# Patient Record
Sex: FEMALE | Race: BLACK OR AFRICAN AMERICAN
[De-identification: names, ages, dates, MRNs, and addresses within clinical notes are randomized per-mention and may not be internally consistent; named-entity substitution may affect disease eponyms.]

---

## 2018-03-25 ENCOUNTER — HOSPITAL ENCOUNTER (EMERGENCY)
Dept: HOSPITAL 72 - EMR | Age: 48
Discharge: HOME | End: 2018-03-25
Payer: SELF-PAY

## 2018-03-25 VITALS — SYSTOLIC BLOOD PRESSURE: 136 MMHG | DIASTOLIC BLOOD PRESSURE: 77 MMHG

## 2018-03-25 VITALS — WEIGHT: 170 LBS | HEIGHT: 66 IN | BODY MASS INDEX: 27.32 KG/M2

## 2018-03-25 DIAGNOSIS — Z90.710: ICD-10-CM

## 2018-03-25 DIAGNOSIS — M54.5: Primary | ICD-10-CM

## 2018-03-25 PROCEDURE — 96372 THER/PROPH/DIAG INJ SC/IM: CPT

## 2018-03-25 PROCEDURE — 99284 EMERGENCY DEPT VISIT MOD MDM: CPT

## 2018-03-25 PROCEDURE — 74018 RADEX ABDOMEN 1 VIEW: CPT

## 2018-03-25 NOTE — EMERGENCY ROOM REPORT
History of Present Illness


General


Chief Complaint:  Back Pain-No Injury


Source:  Patient


 (Yoselyn Babb)





Present Illness


HPI


46 YO Female presents to ED c/o left sided 10/10 in severity low back pain x 3 

days. had colonic performed 3 days ago, has hx of sciatica. 


no trauma or falls.She denies abdominal tenderness or abdominal pain.  Denies 

nausea, vomiting, diarrhea, constipation.  Patient denies history of kidney 

stones. Denies dysuria, hematuria, radiation or migration of pain.  Patient 

reports she also has posterior tenderness to palpation of the left side of the 

back Denies numbness tingling or loss of sensation or gross motor movements of 

the extremities, incontinence of bowel or bladder. Denies CP, Palpitations, LOC

, AMS, dizziness, Changes in Vision, Sensation, paresthesias, or a sudden 

severe headache. 


 (Yoselyn Babb)


Allergies:  


Uncoded Allergies:  


     TB SKIN TEST (Allergy, Unknown, 3/25/18)





Patient History


Past Medical History:  see triage record


Past Surgical History:  none


Pertinent Family History:  none


Last Menstrual Period:  hysterectomy 2015


Pregnant Now:  No


:  2


Para:  2


Reviewed Nursing Documentation:  PMH: Agreed; PSxH: Agreed (Yoselyn Babb)





Nursing Documentation-PMH


Past Medical History:  No History, Except For


 (Yoselyn Babb)





Review of Systems


All Other Systems:  negative except mentioned in HPI


 (Yoselyn Babb)





Physical Exam





Vital Signs








  Date Time  Temp Pulse Resp B/P (MAP) Pulse Ox O2 Delivery O2 Flow Rate FiO2


 


3/25/18 16:58 98.2 85 16 136/77 98 Room Air  





 98.2       








Sp02 EP Interpretation:  reviewed, normal


General Appearance:  no apparent distress, alert, GCS 15, non-toxic


Head:  normocephalic, atraumatic


ENT:  hearing grossly normal, normal voice


Neck:  full range of motion


Respiratory:  lungs clear, normal breath sounds, speaking full sentences


Cardiovascular #1:  regular rate, rhythm


Gastrointestinal:  normal bowel sounds, non tender, soft, no guarding


Genitourinary:  normal inspection, no CVA tenderness


Musculoskeletal:  back normal, gait/station normal, normal range of motion, 

tender - Moderate tenderness to palpation to paraspinal muscles of the lower 

back on the left side, no spinous process tenderness, no midline tenderness.


Neurologic:  alert, oriented x3, responsive, motor strength/tone normal, 

sensory intact, normal gait, speech normal, grossly normal


Psychiatric:  judgement/insight normal


Skin:  normal color, no rash, warm/dry, well hydrated


 (Yoselyn Babb)





Medical Decision Making


PA Attestation


Dr. Oliver is my supervising Physician whom patient management has been 

discussed with. 


 (Yoselyn Babb)


Diagnostic Impression:  


 Primary Impression:  


 Back pain


 Qualified Codes:  M54.5 - Low back pain


ER Course


46 YO Female presents to ED c/o left sided 10/10 in severity low back pain x 3 

days. had colonic performed 3 days ago, has hx of sciatica. 


no trauma or falls.She denies abdominal tenderness or abdominal pain.  Denies 

nausea, vomiting, diarrhea, constipation.  Patient denies history of kidney 

stones. Denies dysuria, hematuria, radiation or migration of pain.  Patient 

reports she also has posterior tenderness to palpation of the left side of the 

back Denies numbness tingling or loss of sensation or gross motor movements of 

the extremities, incontinence of bowel or bladder. Denies CP, Palpitations, LOC

, AMS, dizziness, Changes in Vision, Sensation, paresthesias, or a sudden 

severe headache. Denies hx of cancer or recent spinal procedures. pt. reports 

her symptoms may have started when she dropped soap on shower floor and reached 

down to pick it up.  





Ddx considered: bowel perforation, musculo-skeletal injury, epidural abscess, 

fracture, sprain/strain, meningitis, spinal chord injury. 


-History of physical exam are most consistent with muscular injury due to 

reproducible pain/tenderness upon palpation.  Absence of fever or urinary 

symptoms.





Vital signs reviewed and are WNL during ED visit.


Pt. is afebrile with no signs of infection


No new symptoms, and denies recent trauma.


No saddle anesthesia noted, Pt. denies incontinence 


Neurovascular is intact


no appreciable CVA tenderness. 


* Moderate tenderness to palpation to paraspinal muscles of the lower back on 

the left side, no spinous process tenderness, no midline tenderness. 








ORDERS: 





-Abdominal KUB x-ray: Unremarkable no evidence of free air.  Nonspecific bowel 

gas pattern noted.





INTERVENTIONS: 


- Toradol IM


- Tramadol PO





d/w pt. conservative treatment, and to follow up with a primary care provider. 

pt given a list of primary care clinics for follow up. d/w pt. to return to the 

ED with worsening or new symptoms.





DISCHARGE: At this time pt. is stable for d/c to home. Will provide printed 

patient care instructions, and any necessary prescriptions. Care plan and 

follow up instructions have been discussed with the patient prior to discharge.


 (Yoselyn Babb)





Other X-Ray Diagnostic Results


Other X-Ray Diagnostic Results :  


   X-Ray ordered:  KUB


   # of Views/Limited Vs Complete:  1 View


   Indication:  Pain


   EP Interpretation:  Yes


   PA Xray:  Interpretation reviewed, by supervising MD, and agrees with 

findings.


   Interpretation:  nonspecific bowel gas, no sbo - no free air noted.


   Impression:  No acute disease


   Electronically Signed by:  Yoselyn Babb PA-C


 (Yoselyn Babb)


Other X-Ray Diagnostic Results :  


   Electronically Signed by:  Sha documentation reviewed by me and is 

accurate, Jose Oliver MD.


 (Jose Oliver M.D.)





Last Vital Signs








  Date Time  Temp Pulse Resp B/P (MAP) Pulse Ox O2 Delivery O2 Flow Rate FiO2


 


3/25/18 18:37 98.2       


 


3/25/18 17:33  85 16 136/77 98 Room Air  








 (Yoselyn Babb.A.)





Last Vital Signs








  Date Time  Temp Pulse Resp B/P (MAP) Pulse Ox O2 Delivery O2 Flow Rate FiO2


 


3/25/18 19:06 98.2 85 16 136/77 98 Room Air  





 98.2       








 (Jose Oliver M.D.)


Disposition:  HOME, SELF-CARE


Condition:  Stable


Scripts


Ibuprofen* (MOTRIN*) 600 Mg Tablet


600 MG ORAL THREE TIMES A DAY, #20 TAB 0 Refills


   Prov: Yoselyn Babb P.A.         3/25/18 


Lidocaine (Lidoderm) 1 Each Adh..patch


1 PATCH TOPIC DAILY, #30 PATCH 0 Refills


   Patch(es) may remain in place for up to 12 hours in any 24-hour


   period.


   Prov: Yoselyn Babb P.A.         3/25/18 


Methocarbamol* (ROBAXIN*) 500 Mg Tablet


1000 MG PO TID, #42 TAB 0 Refills


   Prov: Yoselyn Babb P.A.         3/25/18


Referrals:  


NOT CHOSEN IPA/MD,REFERRING (PCP)


Departure Forms:  Return to Work      Return to Work Date:  Mar 28, 2018


   Work Restrictions:  No Heavy Lifting, No Prolonged Standing


   Other Restrictions:  light duty x 1 week. 


   Return to Full Activity:  2018


Patient Instructions:  Back Pain, Adult, Sciatica





Additional Instructions:  


Take medications as directed. 


 ** Follow up with a Primary Care Provider in 3-5 days, even if your symptoms 

have resolved. ** 


--Please review list of primary care clinics, if you do not already have a 

primary care provider





Return sooner to ED if new symptoms occur, or current symptoms become worse. 


Do not drink alcohol, drive, or operate heavy machinery while taking Robaxin as 

this may cause drowsiness. 











- Please note that this Emergency Department Report was dictated using [employer] technology software, occasionally this can lead to 

erroneous entry secondary to interpretation by the dictation equipment.











Yoselyn Babb Mar 25, 2018 18:52


Jose Oliver M.D. Mar 26, 2018 06:12

## 2018-03-26 NOTE — DIAGNOSTIC IMAGING REPORT
Indication: Abdominal pain

 

Technique: Supine view of the abdomen

 

Comparison: none

 

Findings: The stomach appears to be filled with food. The bowel gas pattern is

unremarkable. No unusual masses or calcifications.

 

Impression: No acute process

## 2018-03-28 ENCOUNTER — HOSPITAL ENCOUNTER (EMERGENCY)
Dept: HOSPITAL 72 - EMR | Age: 48
LOS: 1 days | Discharge: HOME | End: 2018-03-29
Payer: SELF-PAY

## 2018-03-28 VITALS — HEIGHT: 66 IN | BODY MASS INDEX: 27.32 KG/M2 | WEIGHT: 170 LBS

## 2018-03-28 VITALS — SYSTOLIC BLOOD PRESSURE: 118 MMHG | DIASTOLIC BLOOD PRESSURE: 74 MMHG

## 2018-03-28 DIAGNOSIS — M54.5: Primary | ICD-10-CM

## 2018-03-28 PROCEDURE — 99283 EMERGENCY DEPT VISIT LOW MDM: CPT

## 2018-03-28 PROCEDURE — 96372 THER/PROPH/DIAG INJ SC/IM: CPT

## 2018-03-28 NOTE — EMERGENCY ROOM REPORT
History of Present Illness


General


Chief Complaint:  Lower Back Pain or Injury


Source:  Patient





Present Illness


HPI


47-year-old female p/w back pain for 7 days. Pain is localized to bilateral 

lower back, sharp in nature, radiating down leg. Movement worsens pain.  

Patient states that in her job she frequently picks up many things and has 

vigorous physical activity.  There are no alleviating factors


Patient was recently seen in the ER 3 days ago, got prescriptions for 

medications but could not fill them due to her insurance


Patient has experienced this similar pain in the past. 


Denies trauma. Denies lower extremity weakness/numbness, no bowel/bladder 

retention or incontinence, saddle anesthesia. Denies fever, chills, abdominal 

pain, n/v, dysuria/hematuria. No history of IVDA


Allergies:  


Uncoded Allergies:  


     TB SKIN TEST (Allergy, Unknown, 3/25/18)





Patient History


Past Medical History:  see triage record


Past Surgical History:  none


Pertinent Family History:  none


Last Menstrual Period:  had hysterectomy


Reviewed Nursing Documentation:  PMH: Agreed; PSxH: Agreed





Review of Systems


All Other Systems:  negative except mentioned in HPI





Physical Exam





Vital Signs








  Date Time  Temp Pulse Resp B/P (MAP) Pulse Ox O2 Delivery O2 Flow Rate FiO2


 


3/28/18 23:32 98.5 75 18 118/74 98 Room Air  





 98.4       








Sp02 EP Interpretation:  reviewed, normal


General Appearance:  alert, GCS 15, non-toxic, mild distress


Head:  normocephalic, atraumatic


Eyes:  bilateral eye normal inspection, bilateral eye PERRL, bilateral eye EOMI


ENT:  normal ENT inspection, normal pharynx, normal voice, moist mucus membranes


Neck:  normal inspection, full range of motion, supple


Respiratory:  normal inspection, lungs clear, normal breath sounds, no 

respiratory distress, no retraction, no wheezing, speaking full sentences, 

chest symmetrical


Cardiovascular #1:  normal inspection, regular rate, rhythm, no edema, normal 

capillary refill


Cardiovascular #2:  2+ radial (R), 2+ radial (L)


Gastrointestinal:  normal inspection, non tender, soft, non-distended, no 

guarding


Musculoskeletal:  other - Bilateral lower lumbar paraspinal tenderness, no 

midline tenderness, full range of motion all extremities, gait normal


Neurologic:  normal inspection, alert, oriented x3, responsive, motor strength/

tone normal, sensory intact, normal gait, speech normal


Psychiatric:  normal inspection, judgement/insight normal, memory normal


Skin:  normal inspection, normal color, no rash, warm/dry, well hydrated, 

normal turgor





Medical Decision Making


Diagnostic Impression:  


 Primary Impression:  


 Low back pain


ER Course


39-year-old female with one week of back pain





DDX:


Likely musculoskeletal back pain vs. muscular strain vs. sciatica


Lumbar fracture is unlikely given patients age, no midline tenderness, no 

history of trauma, and that patient is ambulatory. Therefore, at this time no 

imaging is indicated 


Serious diagnoses such as cord compression, epidural abscess is unlikely in 

this patient given the clinical scenario and abscess of neurological symptoms 

or findings. Patient appears nontoxic. 





Plan: 


Toradol, robaxin





ER course:


Patient has remained nontoxic appearing and ambulatory in the ED. 


Pain improved w/ medications





Disposition:


Patient will be discharged to home, patient already has prescriptions


Strict precautions discussed with patient on when to emergently return to the 

ED which includes severe/worsening back pain, leg weakness/numbness, urinary 

retention/incontinence, fever or chills, which may indicate severe illness.


Patient is to follow up with their PMD within 5 days.  Also told that she 

should likely benefit from physical therapy.  Patient agrees with plan. 





Please note that this Emergency Department Report was dictated using [employer] technology software, occasionally this can lead to 

erroneous entry secondary to interpretation by the dictation equipment.





Last Vital Signs








  Date Time  Temp Pulse Resp B/P (MAP) Pulse Ox O2 Delivery O2 Flow Rate FiO2


 


3/28/18 23:32 98.5 75 18 118/74 98 Room Air  





 98.4       








Disposition:  HOME, SELF-CARE


Condition:  Improved











Chayito Welch M.D. Mar 28, 2018 23:48

## 2018-03-29 VITALS — SYSTOLIC BLOOD PRESSURE: 118 MMHG | DIASTOLIC BLOOD PRESSURE: 74 MMHG

## 2018-09-17 ENCOUNTER — HOSPITAL ENCOUNTER (EMERGENCY)
Dept: HOSPITAL 72 - EMR | Age: 48
Discharge: HOME | End: 2018-09-17
Payer: SELF-PAY

## 2018-09-17 VITALS — DIASTOLIC BLOOD PRESSURE: 75 MMHG | SYSTOLIC BLOOD PRESSURE: 124 MMHG

## 2018-09-17 VITALS — WEIGHT: 180 LBS | BODY MASS INDEX: 28.93 KG/M2 | HEIGHT: 66 IN

## 2018-09-17 VITALS — SYSTOLIC BLOOD PRESSURE: 133 MMHG | DIASTOLIC BLOOD PRESSURE: 77 MMHG

## 2018-09-17 DIAGNOSIS — M54.9: Primary | ICD-10-CM

## 2018-09-17 PROCEDURE — 99283 EMERGENCY DEPT VISIT LOW MDM: CPT

## 2018-09-17 PROCEDURE — 96372 THER/PROPH/DIAG INJ SC/IM: CPT

## 2018-09-17 NOTE — EMERGENCY ROOM REPORT
History of Present Illness


General


Chief Complaint:  Back Pain-No Injury


Source:  Patient





Present Illness


HPI


This patient has a history of back pain that intermittently gets aggravated and 

become severe.  She states she has been seen by an orthopedic surgeon.  She 

underwent MRI of her lumbar spine and there were no abnormalities.  She was 

told she has sciatica.  She states that a week ago she had another exacerbation 

of her symptoms.  She states she was lifting a grocery bag when she stood up 

the pain was severe and brought her to her knees.  She states she's been 

uncomfortable for the past week.  She has been using ibuprofen for pain.  She 

denies tingling or numbness.  She denies loss of bowel or bladder control.  She 

denies fever or chills.  She denies trauma.  She denies dysuria or hematuria.  

She has no other complaints.


Allergies:  


Uncoded Allergies:  


     TB SKIN TEST (Allergy, Unknown, 3/25/18)





Patient History


Past Medical History:  none, see triage record


Past Surgical History:  hysterectomy


Social History:  Denies: smoking, alcohol use, drug use


Reviewed Nursing Documentation:  PMH: Agreed; PSxH: Agreed





Nursing Documentation-PMH


Past Medical History:  No History, Except For





Review of Systems


All Other Systems:  negative except mentioned in HPI





Physical Exam





Vital Signs








  Date Time  Temp Pulse Resp B/P (MAP) Pulse Ox O2 Delivery O2 Flow Rate FiO2


 


9/17/18 10:31 98.4 90 17 124/75 98 Room Air  





 98.4       








Sp02 EP Interpretation:  reviewed, normal


General Appearance:  no apparent distress, alert, GCS 15, non-toxic


Head:  normocephalic, atraumatic


Eyes:  bilateral eye normal inspection, bilateral eye PERRL


ENT:  hearing grossly normal, normal pharynx, no angioedema, normal voice


Neck:  full range of motion, supple/symm/no masses


Respiratory:  chest non-tender, lungs clear, normal breath sounds, no 

respiratory distress, no retraction, no accessory muscle use, speaking full 

sentences


Cardiovascular #1:  regular rate, rhythm, no edema


Gastrointestinal:  normal bowel sounds, non tender, soft, non-distended, no 

guarding, no rebound


Rectal:  deferred


Musculoskeletal:  normal range of motion, other - slow gait.  +TTP over the 

paraspinal m of R. Lumbar spine.


Neurologic:  alert, oriented x3, responsive, motor strength/tone normal, 

sensory intact, speech normal


Psychiatric:  judgement/insight normal, memory normal, mood/affect normal, no 

suicidal/homicidal ideation


Skin:  normal color, no rash, warm/dry, well hydrated





Medical Decision Making


Diagnostic Impression:  


 Primary Impression:  


 Back pain


ER Course


This patient has a clinical presentation consistent with mechanical back pain.  

There are no red flags on physical exam.  The patient denies any concerning 

features such as trauma, fevers, night sweats, history of malignancy, pain 

worse at night, IV drug abuse, urinary/fecal incontinence or retention, focal 

weakness or change in sensation, or refractory pain.  Given these pertinent 

negatives in the history and physical exam an emergent cause of the back pain 

such as epidural abscess, metastasis to bone, cauda equina syndrome, and 

fracture is less likely.  I also doubt emergent cardiovascular cause of back 

pain such as aortic dissection a ruptured abdominal aortic aneurysm given 

patient with equal pulses in all 4 extremities with no diastolic murmur or 

pulsatile abdominal mass.  The patient was counseled that, though unlikely, the 

possibility of an emergent cause of back pain may still be present and that the 

patient should return immediately if symptoms persist or worsen.  The symptoms 

are reproducible with movement.  Patient had a benign evaluation and neurologic 

examination.  No emergency etiology was identified.





Last Vital Signs








  Date Time  Temp Pulse Resp B/P (MAP) Pulse Ox O2 Delivery O2 Flow Rate FiO2


 


9/17/18 12:55 98.4       


 


9/17/18 10:41   17 124/75 98 Room Air  


 


9/17/18 10:31  90      








Status:  improved


Disposition:  HOME, SELF-CARE


Condition:  Improved


Referrals:  


NOT CHOSEN IPA/MD,REFERRING (PCP)


Patient Instructions:  Back Pain, Adult











Shannen Mathew DO Sep 17, 2018 13:07